# Patient Record
Sex: MALE | Race: WHITE | NOT HISPANIC OR LATINO
[De-identification: names, ages, dates, MRNs, and addresses within clinical notes are randomized per-mention and may not be internally consistent; named-entity substitution may affect disease eponyms.]

---

## 2023-07-15 ENCOUNTER — TRANSCRIPTION ENCOUNTER (OUTPATIENT)
Age: 63
End: 2023-07-15

## 2023-07-15 ENCOUNTER — INPATIENT (INPATIENT)
Facility: HOSPITAL | Age: 63
LOS: 0 days | Discharge: HOME | DRG: 206 | End: 2023-07-16
Attending: THORACIC SURGERY (CARDIOTHORACIC VASCULAR SURGERY) | Admitting: RADIOLOGY
Payer: COMMERCIAL

## 2023-07-15 VITALS — HEIGHT: 71 IN | WEIGHT: 210.76 LBS

## 2023-07-15 DIAGNOSIS — T17.808A UNSPECIFIED FOREIGN BODY IN OTHER PARTS OF RESPIRATORY TRACT CAUSING OTHER INJURY, INITIAL ENCOUNTER: ICD-10-CM

## 2023-07-15 DIAGNOSIS — T18.3 FOREIGN BODY IN SMALL INTESTINE: ICD-10-CM

## 2023-07-15 LAB
ALBUMIN SERPL ELPH-MCNC: 4.4 G/DL — SIGNIFICANT CHANGE UP (ref 3.3–5.2)
ALP SERPL-CCNC: 79 U/L — SIGNIFICANT CHANGE UP (ref 40–120)
ALT FLD-CCNC: 28 U/L — SIGNIFICANT CHANGE UP
ANION GAP SERPL CALC-SCNC: 11 MMOL/L — SIGNIFICANT CHANGE UP (ref 5–17)
APTT BLD: 30 SEC — SIGNIFICANT CHANGE UP (ref 27.5–35.5)
AST SERPL-CCNC: 17 U/L — SIGNIFICANT CHANGE UP
BASOPHILS # BLD AUTO: 0.07 K/UL — SIGNIFICANT CHANGE UP (ref 0–0.2)
BASOPHILS NFR BLD AUTO: 0.9 % — SIGNIFICANT CHANGE UP (ref 0–2)
BILIRUB SERPL-MCNC: 0.6 MG/DL — SIGNIFICANT CHANGE UP (ref 0.4–2)
BLD GP AB SCN SERPL QL: SIGNIFICANT CHANGE UP
BUN SERPL-MCNC: 16.8 MG/DL — SIGNIFICANT CHANGE UP (ref 8–20)
CALCIUM SERPL-MCNC: 8.7 MG/DL — SIGNIFICANT CHANGE UP (ref 8.4–10.5)
CHLORIDE SERPL-SCNC: 106 MMOL/L — SIGNIFICANT CHANGE UP (ref 96–108)
CO2 SERPL-SCNC: 24 MMOL/L — SIGNIFICANT CHANGE UP (ref 22–29)
CREAT SERPL-MCNC: 0.98 MG/DL — SIGNIFICANT CHANGE UP (ref 0.5–1.3)
EGFR: 87 ML/MIN/1.73M2 — SIGNIFICANT CHANGE UP
EOSINOPHIL # BLD AUTO: 0.18 K/UL — SIGNIFICANT CHANGE UP (ref 0–0.5)
EOSINOPHIL NFR BLD AUTO: 2.3 % — SIGNIFICANT CHANGE UP (ref 0–6)
GLUCOSE SERPL-MCNC: 96 MG/DL — SIGNIFICANT CHANGE UP (ref 70–99)
HCT VFR BLD CALC: 41.4 % — SIGNIFICANT CHANGE UP (ref 39–50)
HGB BLD-MCNC: 13.4 G/DL — SIGNIFICANT CHANGE UP (ref 13–17)
IMM GRANULOCYTES NFR BLD AUTO: 0.4 % — SIGNIFICANT CHANGE UP (ref 0–0.9)
INR BLD: 1.06 RATIO — SIGNIFICANT CHANGE UP (ref 0.88–1.16)
LYMPHOCYTES # BLD AUTO: 2.66 K/UL — SIGNIFICANT CHANGE UP (ref 1–3.3)
LYMPHOCYTES # BLD AUTO: 33.8 % — SIGNIFICANT CHANGE UP (ref 13–44)
MAGNESIUM SERPL-MCNC: 2.4 MG/DL — SIGNIFICANT CHANGE UP (ref 1.6–2.6)
MCHC RBC-ENTMCNC: 28.3 PG — SIGNIFICANT CHANGE UP (ref 27–34)
MCHC RBC-ENTMCNC: 32.4 GM/DL — SIGNIFICANT CHANGE UP (ref 32–36)
MCV RBC AUTO: 87.3 FL — SIGNIFICANT CHANGE UP (ref 80–100)
MONOCYTES # BLD AUTO: 0.61 K/UL — SIGNIFICANT CHANGE UP (ref 0–0.9)
MONOCYTES NFR BLD AUTO: 7.8 % — SIGNIFICANT CHANGE UP (ref 2–14)
NEUTROPHILS # BLD AUTO: 4.31 K/UL — SIGNIFICANT CHANGE UP (ref 1.8–7.4)
NEUTROPHILS NFR BLD AUTO: 54.8 % — SIGNIFICANT CHANGE UP (ref 43–77)
PLATELET # BLD AUTO: 212 K/UL — SIGNIFICANT CHANGE UP (ref 150–400)
POTASSIUM SERPL-MCNC: 3.8 MMOL/L — SIGNIFICANT CHANGE UP (ref 3.5–5.3)
POTASSIUM SERPL-SCNC: 3.8 MMOL/L — SIGNIFICANT CHANGE UP (ref 3.5–5.3)
PROT SERPL-MCNC: 6.5 G/DL — LOW (ref 6.6–8.7)
PROTHROM AB SERPL-ACNC: 12.3 SEC — SIGNIFICANT CHANGE UP (ref 10.5–13.4)
RBC # BLD: 4.74 M/UL — SIGNIFICANT CHANGE UP (ref 4.2–5.8)
RBC # FLD: 13.1 % — SIGNIFICANT CHANGE UP (ref 10.3–14.5)
SODIUM SERPL-SCNC: 141 MMOL/L — SIGNIFICANT CHANGE UP (ref 135–145)
WBC # BLD: 7.86 K/UL — SIGNIFICANT CHANGE UP (ref 3.8–10.5)
WBC # FLD AUTO: 7.86 K/UL — SIGNIFICANT CHANGE UP (ref 3.8–10.5)

## 2023-07-15 PROCEDURE — 71250 CT THORAX DX C-: CPT | Mod: 26

## 2023-07-15 PROCEDURE — 99222 1ST HOSP IP/OBS MODERATE 55: CPT

## 2023-07-15 RX ORDER — SODIUM CHLORIDE 9 MG/ML
3 INJECTION INTRAMUSCULAR; INTRAVENOUS; SUBCUTANEOUS EVERY 8 HOURS
Refills: 0 | Status: DISCONTINUED | OUTPATIENT
Start: 2023-07-15 | End: 2023-07-16

## 2023-07-15 RX ORDER — LOSARTAN POTASSIUM 100 MG/1
1 TABLET, FILM COATED ORAL
Refills: 0 | DISCHARGE

## 2023-07-15 RX ORDER — PIPERACILLIN AND TAZOBACTAM 4; .5 G/20ML; G/20ML
3.38 INJECTION, POWDER, LYOPHILIZED, FOR SOLUTION INTRAVENOUS ONCE
Refills: 0 | Status: COMPLETED | OUTPATIENT
Start: 2023-07-15 | End: 2023-07-15

## 2023-07-15 RX ORDER — ATORVASTATIN CALCIUM 80 MG/1
1 TABLET, FILM COATED ORAL
Refills: 0 | DISCHARGE

## 2023-07-15 RX ORDER — PIPERACILLIN AND TAZOBACTAM 4; .5 G/20ML; G/20ML
3.38 INJECTION, POWDER, LYOPHILIZED, FOR SOLUTION INTRAVENOUS EVERY 8 HOURS
Refills: 0 | Status: DISCONTINUED | OUTPATIENT
Start: 2023-07-15 | End: 2023-07-16

## 2023-07-15 RX ORDER — LOSARTAN POTASSIUM 100 MG/1
25 TABLET, FILM COATED ORAL DAILY
Refills: 0 | Status: DISCONTINUED | OUTPATIENT
Start: 2023-07-15 | End: 2023-07-16

## 2023-07-15 RX ORDER — ATORVASTATIN CALCIUM 80 MG/1
20 TABLET, FILM COATED ORAL AT BEDTIME
Refills: 0 | Status: DISCONTINUED | OUTPATIENT
Start: 2023-07-15 | End: 2023-07-16

## 2023-07-15 RX ADMIN — SODIUM CHLORIDE 3 MILLILITER(S): 9 INJECTION INTRAMUSCULAR; INTRAVENOUS; SUBCUTANEOUS at 05:47

## 2023-07-15 RX ADMIN — PIPERACILLIN AND TAZOBACTAM 200 GRAM(S): 4; .5 INJECTION, POWDER, LYOPHILIZED, FOR SOLUTION INTRAVENOUS at 03:03

## 2023-07-15 RX ADMIN — SODIUM CHLORIDE 3 MILLILITER(S): 9 INJECTION INTRAMUSCULAR; INTRAVENOUS; SUBCUTANEOUS at 13:52

## 2023-07-15 RX ADMIN — ATORVASTATIN CALCIUM 20 MILLIGRAM(S): 80 TABLET, FILM COATED ORAL at 21:43

## 2023-07-15 RX ADMIN — PIPERACILLIN AND TAZOBACTAM 25 GRAM(S): 4; .5 INJECTION, POWDER, LYOPHILIZED, FOR SOLUTION INTRAVENOUS at 07:32

## 2023-07-15 RX ADMIN — LOSARTAN POTASSIUM 25 MILLIGRAM(S): 100 TABLET, FILM COATED ORAL at 14:47

## 2023-07-15 RX ADMIN — PIPERACILLIN AND TAZOBACTAM 25 GRAM(S): 4; .5 INJECTION, POWDER, LYOPHILIZED, FOR SOLUTION INTRAVENOUS at 14:48

## 2023-07-15 RX ADMIN — PIPERACILLIN AND TAZOBACTAM 25 GRAM(S): 4; .5 INJECTION, POWDER, LYOPHILIZED, FOR SOLUTION INTRAVENOUS at 21:43

## 2023-07-15 RX ADMIN — SODIUM CHLORIDE 3 MILLILITER(S): 9 INJECTION INTRAMUSCULAR; INTRAVENOUS; SUBCUTANEOUS at 22:00

## 2023-07-15 NOTE — H&P ADULT - NSICDXFAMHXNEG_GEN_ALL
asthma/atrial fibrillation/cancer/congestive heart failure/COPD/coronary disease/diabetes/kidney disease

## 2023-07-15 NOTE — H&P ADULT - ASSESSMENT
63 M with h/o HTN, HLD, [place] [facility] after accidentally swallowing a metal bolt while changing a light fixture.  Denies shortness of breath but states has chest discomfort and can feel bolt inside.  Pt transferred to [facility] for bronchoscopy 7/15 with Dr. Benton for FB retrieval.

## 2023-07-15 NOTE — H&P ADULT - PROBLEM SELECTOR PLAN 1
Pt planned for bronchoscopy with foreign body retrieval later this morning with Dr. Benton.  Pt to remain NPO  Zosyn for empiric coverage  Telemetry,   Noncontrast CT chest pending    Plan discussed with Dr. Benton.

## 2023-07-15 NOTE — H&P ADULT - HISTORY OF PRESENT ILLNESS
63 M with h/o HTN, HLD, initially presented to [facility] [place] [facility] on 7/14 after accidentally swallowing a metal bolt while changing a light fixture.  Denies shortness of breath but states has chest discomfort and can feel bolt inside.  Pt transferred to [facility] for bronchoscopy 7/15 with Dr. Benton for FB retrieval.  Currently has no other physical complaints at this time.  Denies f/c, n/v, chest pain, sob, abdominal pain, d/c, urinary symptoms.  ROS negative x 10 except as indicated in HPI.

## 2023-07-15 NOTE — H&P ADULT - NSHPPHYSICALEXAM_GEN_ALL_CORE
Neuro: A+O x 3, non-focal, speech clear and intact  HEENT: PERRL, EOMI, oral mucosa pink and moist  Neck: supple, no JVD  CV: regular rate, regular rhythm, +S1S2, no murmurs or rub  Pulm/chest: lung sounds CTA and equal bilaterally, no accessory muscle use noted  Abd: soft, NT, ND, +BS  Ext: MCKEON x 4, no C/C/E  Skin: warm, well perfused, no rashes

## 2023-07-16 ENCOUNTER — TRANSCRIPTION ENCOUNTER (OUTPATIENT)
Age: 63
End: 2023-07-16

## 2023-07-16 VITALS
RESPIRATION RATE: 17 BRPM | SYSTOLIC BLOOD PRESSURE: 137 MMHG | OXYGEN SATURATION: 100 % | HEART RATE: 55 BPM | DIASTOLIC BLOOD PRESSURE: 85 MMHG

## 2023-07-16 PROCEDURE — 31635 BRONCHOSCOPY W/FB REMOVAL: CPT

## 2023-07-16 PROCEDURE — 36415 COLL VENOUS BLD VENIPUNCTURE: CPT

## 2023-07-16 PROCEDURE — 85025 COMPLETE CBC W/AUTO DIFF WBC: CPT

## 2023-07-16 PROCEDURE — 85610 PROTHROMBIN TIME: CPT

## 2023-07-16 PROCEDURE — 71045 X-RAY EXAM CHEST 1 VIEW: CPT

## 2023-07-16 PROCEDURE — 88300 SURGICAL PATH GROSS: CPT | Mod: 26

## 2023-07-16 PROCEDURE — 85730 THROMBOPLASTIN TIME PARTIAL: CPT

## 2023-07-16 PROCEDURE — 80053 COMPREHEN METABOLIC PANEL: CPT

## 2023-07-16 PROCEDURE — 86803 HEPATITIS C AB TEST: CPT

## 2023-07-16 PROCEDURE — 88300 SURGICAL PATH GROSS: CPT

## 2023-07-16 PROCEDURE — 86900 BLOOD TYPING SEROLOGIC ABO: CPT

## 2023-07-16 PROCEDURE — 71250 CT THORAX DX C-: CPT

## 2023-07-16 PROCEDURE — 86901 BLOOD TYPING SEROLOGIC RH(D): CPT

## 2023-07-16 PROCEDURE — 86850 RBC ANTIBODY SCREEN: CPT

## 2023-07-16 PROCEDURE — 71045 X-RAY EXAM CHEST 1 VIEW: CPT | Mod: 26

## 2023-07-16 PROCEDURE — 83735 ASSAY OF MAGNESIUM: CPT

## 2023-07-16 RX ORDER — SODIUM CHLORIDE 9 MG/ML
1000 INJECTION, SOLUTION INTRAVENOUS
Refills: 0 | Status: DISCONTINUED | OUTPATIENT
Start: 2023-07-16 | End: 2023-07-16

## 2023-07-16 RX ORDER — DEXAMETHASONE 0.5 MG/5ML
8 ELIXIR ORAL ONCE
Refills: 0 | Status: COMPLETED | OUTPATIENT
Start: 2023-07-16 | End: 2023-07-16

## 2023-07-16 RX ORDER — FENTANYL CITRATE 50 UG/ML
25 INJECTION INTRAVENOUS
Refills: 0 | Status: DISCONTINUED | OUTPATIENT
Start: 2023-07-16 | End: 2023-07-16

## 2023-07-16 RX ADMIN — PIPERACILLIN AND TAZOBACTAM 25 GRAM(S): 4; .5 INJECTION, POWDER, LYOPHILIZED, FOR SOLUTION INTRAVENOUS at 05:29

## 2023-07-16 RX ADMIN — LOSARTAN POTASSIUM 25 MILLIGRAM(S): 100 TABLET, FILM COATED ORAL at 05:29

## 2023-07-16 RX ADMIN — SODIUM CHLORIDE 3 MILLILITER(S): 9 INJECTION INTRAMUSCULAR; INTRAVENOUS; SUBCUTANEOUS at 06:15

## 2023-07-16 RX ADMIN — Medication 101.6 MILLIGRAM(S): at 12:18

## 2023-07-16 NOTE — PATIENT PROFILE ADULT - FALL HARM RISK - UNIVERSAL INTERVENTIONS
Bed in lowest position, wheels locked, appropriate side rails in place/Call bell, personal items and telephone in reach/Instruct patient to call for assistance before getting out of bed or chair/Non-slip footwear when patient is out of bed/[facility] to call system/Physically safe environment - no spills, clutter or unnecessary equipment/Purposeful Proactive Rounding/Room/bathroom lighting operational, light cord in reach

## 2023-07-16 NOTE — DISCHARGE NOTE PROVIDER - NSDCCPCAREPLAN_GEN_ALL_CORE_FT
PRINCIPAL DISCHARGE DIAGNOSIS  Diagnosis: Aspiration of foreign body into lung  Assessment and Plan of Treatment: Continue breathing exercises hourly   May resume all normal activities     PRINCIPAL DISCHARGE DIAGNOSIS  Diagnosis: Aspiration of foreign body into lung  Assessment and Plan of Treatment: Continue breathing exercises hourly   Do not drive or operate heavy machinery for the rest of the day  Call the thoracic surgery office to make your follow up appointment with Dr. Benton

## 2023-07-16 NOTE — PATIENT PROFILE ADULT - HOME ACCESSIBILITY CONCERNS
Cardiac Rhythm Strips 04/01/21-04/02/21 from [id number]     Tele Strip 04/01/21 at 1900 - SR        Tele Strip 04/02/21 at 0300 - SR none

## 2023-07-16 NOTE — DISCHARGE NOTE PROVIDER - NSDCFUADDAPPT_GEN_ALL_CORE_FT
Please call on Monday 7/17 to schedule your follow up with Dr. Benton     The throacic surgery office is located at [facility], first floor. Take a left at the end of the lobby until the end of that adame (past the elevator bank). Make a left and the office is on your right across from the elevators.     Follow up with your primary care provider in 2-4 weeks

## 2023-07-16 NOTE — PROGRESS NOTE ADULT - PROBLEM SELECTOR PLAN 1
Pt planned for OR bronchoscopy with foreign body retrieval later this morning with Dr. Benton.  Pt to remain NPO after midnight.  Zosyn for empiric coverage  Telemetry,   Noncontrast CT chest as above.    Plan to be discussed with Dr. Benton during AM rounds.

## 2023-07-16 NOTE — BRIEF OPERATIVE NOTE - NSICDXBRIEFPROCEDURE_GEN_ALL_CORE_FT
PROCEDURES:  Bronchoscopy with removal of foreign body of right lung 16-Jul-2023 09:04:24  Arnie Goldstein

## 2023-07-16 NOTE — DISCHARGE NOTE PROVIDER - CARE PROVIDER_API CALL
Alex Benton  Thoracic Surgery  [address] [place] [address]  Phone: [phone]  Fax: [phone]  Follow Up Time:

## 2023-07-16 NOTE — PROGRESS NOTE ADULT - ASSESSMENT
63 M with h/o HTN, HLD, initially presented to [facility] [place] on 7/14 after accidentally swallowing a metal bolt while changing a light fixture.  Denies shortness of breath but states has chest discomfort and can feel bolt inside.  Pt transferred to [facility] for bronchoscopy 7/15 with Dr. Benton for FB retrieval.

## 2023-07-16 NOTE — DISCHARGE NOTE PROVIDER - NSDCFUADDINST_GEN_ALL_CORE_FT
Please call the Cardiothoracic Surgery office at [phone] if you are experiencing any shortness of breath, chest pain, fevers or chills, drainage from the incisions, persistent nausea, vomiting or if you have any questions about your medications. If the symptoms are severe, call 911 and go to the nearest hospital. You can also call [phone] for an emergency [facility] ambulance, which will take you to the closest [facility].    If you need any assistance for making any appointments for a new consult or referral in any specialty, please call our [facility] Clinical Coordination Center at [phone].

## 2023-07-16 NOTE — DISCHARGE NOTE NURSING/CASE MANAGEMENT/SOCIAL WORK - PATIENT PORTAL LINK FT
You can access the FollowMyHealth Patient Portal offered by [facility] by registering at the following website: [email or web address]. By joining [facility]’s FollowMyHealth portal, you will also be able to view your health information using other applications (apps) compatible with our system.

## 2023-07-16 NOTE — DISCHARGE NOTE PROVIDER - HOSPITAL COURSE
63 M with h/o HTN, HLD, initially presented to [facility] on 7/14 after accidentally aspirating a metal bolt while changing a light fixture.  Denies shortness of breath but states has chest discomfort and can feel bolt inside.  Pt transferred to [facility] for bronchoscopy 7/15 with Dr. Benton for FB retrieval. Now s/p Bronchoscopy with removal of foreign body of right lung.P Patient received decadron IV X2 (intra op and post op). Will not need to continue steroid or antibiotic upon discharge. Patient remains stable from respiratory and hemodynamic standpoint. Discussed with Dr. Benton and patient cleared for discharge home.

## 2023-07-16 NOTE — DISCHARGE NOTE PROVIDER - NSDCMRMEDTOKEN_GEN_ALL_CORE_FT
atorvastatin 20 mg oral tablet: 1 orally once a day (at bedtime)  losartan 25 mg oral tablet: 1 orally once a day

## 2023-07-16 NOTE — DISCHARGE NOTE PROVIDER - NSDCCPTREATMENT_GEN_ALL_CORE_FT
PRINCIPAL PROCEDURE  Procedure: Bronchoscopy with removal of foreign body of right lung  Findings and Treatment:       SECONDARY PROCEDURE  Procedure: Bronchoscopy  Findings and Treatment:

## 2023-07-16 NOTE — PROGRESS NOTE ADULT - SUBJECTIVE AND OBJECTIVE BOX
Brief summary:  63yMale     Overnight events:      PAST MEDICAL & SURGICAL HISTORY:  HTN (hypertension)    HLD (hyperlipidemia)    No significant past surgical history        Medications:  atorvastatin 20 milliGRAM(s) Oral at bedtime  losartan 25 milliGRAM(s) Oral daily  piperacillin/tazobactam IVPB.. 3.375 Gram(s) IV Intermittent every 8 hours  sodium chloride 0.9% lock flush 3 milliLiter(s) IV Push every 8 hours      MEDICATIONS  (PRN):        Daily Review:    Height (cm): 180.3 (07-15 @ 01:38)  Weight (kg): 95.6 (07-15 @ 01:38)  BMI (kg/m2): 29.4 (07-15 @ 01:38)  BSA (m2): 2.16 (07-15 @ 01:38)               13.4   7.86  )-----------( 212      ( 15 Jul 2023 02:26 )             41.4   07-15    141  |  106  |  16.8  ----------------------------<  96  3.8   |  24.0  |  0.98    Ca    8.7      15 Jul 2023 02:26  Mg     2.4     07-15    TPro  6.5<L>  /  Alb  4.4  /  TBili  0.6  /  DBili  x   /  AST  17  /  ALT  28  /  AlkPhos  79  07-15    PT/INR - ( 15 Jul 2023 02:26 )   PT: 12.3 sec;   INR: 1.06 ratio      PTT - ( 15 Jul 2023 02:26 )  PTT:30.0 sec      T(C): 36.1 (07-16-23 @ 00:26), Max: 36.7 (07-15-23 @ 01:52)  HR: 48 (07-16-23 @ 00:26) (48 - 63)  BP: 151/8 (07-16-23 @ 00:26) (129/76 - 162/98)  RR: 18 (07-16-23 @ 00:26) (17 - 18)  SpO2: 100% (07-16-23 @ 00:26) (95% - 100%)        Physical Exam    Neuro: A+O x 3, non-focal, speech clear and intact  Pulm: coarse breath sounds bilaterally, no wheezing or rales  CV: RRR, irregularly irregular, +S1S2  Abd: soft, NT, ND, hypoactive / normoactive / hyperactive bowel sounds  Ext: +DP Pulses b/l, +PT pulses, no edema  Inc: Mediastinal sternal incision C/D/I/stable w/ dressing, right / left C/D/I with Ace wrap  Chest tubes: left  / right / mediastinal chest tubes               Brief summary:  63yMale seen and assessed at bedside.  Pt sitting comfortably in hospital chair in NAD on room air.  States no current physical complaints at this time.  Pt pending OR later today for FB retrieval with Dr. Benton.  Denies f/c, n/v, chest pain, sob, abdominal pain, d/c, urinary symptoms.  ROS negative x 10.    Overnight events:  None.  Hospital course progressing as expected.      PAST MEDICAL & SURGICAL HISTORY:  HTN (hypertension)    HLD (hyperlipidemia)    No significant past surgical history        Medications:  atorvastatin 20 milliGRAM(s) Oral at bedtime  losartan 25 milliGRAM(s) Oral daily  piperacillin/tazobactam IVPB.. 3.375 Gram(s) IV Intermittent every 8 hours  sodium chloride 0.9% lock flush 3 milliLiter(s) IV Push every 8 hours      MEDICATIONS  (PRN):        Daily Review:    Height (cm): 180.3 (07-15 @ 01:38)  Weight (kg): 95.6 (07-15 @ 01:38)  BMI (kg/m2): 29.4 (07-15 @ 01:38)  BSA (m2): 2.16 (07-15 @ 01:38)               13.4   7.86  )-----------( 212      ( 15 Jul 2023 02:26 )             41.4   07-15    141  |  106  |  16.8  ----------------------------<  96  3.8   |  24.0  |  0.98    Ca    8.7      15 Jul 2023 02:26  Mg     2.4     07-15    TPro  6.5<L>  /  Alb  4.4  /  TBili  0.6  /  DBili  x   /  AST  17  /  ALT  28  /  AlkPhos  79  07-15    PT/INR - ( 15 Jul 2023 02:26 )   PT: 12.3 sec;   INR: 1.06 ratio      PTT - ( 15 Jul 2023 02:26 )  PTT:30.0 sec      T(C): 36.1 (07-16-23 @ 00:26), Max: 36.7 (07-15-23 @ 01:52)  HR: 48 (07-16-23 @ 00:26) (48 - 63)  BP: 151/8 (07-16-23 @ 00:26) (129/76 - 162/98)  RR: 18 (07-16-23 @ 00:26) (17 - 18)  SpO2: 100% (07-16-23 @ 00:26) (95% - 100%)        Physical Exam    Neuro: A+O x 3, non-focal, speech clear and intact  Pulm: coarse breath sounds bilaterally, no wheezing or rales  CV: RRR, +S1S2  Abd: soft, NT, ND, normoactive bowel sounds  Ext: +DP Pulses b/l, +PT pulses, no edema        Noncontrast CT Chest 7/15/23    < from: CT Chest No Cont (07.15.23 @ 08:46) >    ACC: [id number] EXAM:  CT CHEST   ORDERED BY: LEX SAMANO     PROCEDURE DATE:  07/15/2023          INTERPRETATION:  Clinical information: Swallowed metal bolt.    CT scan of the chest was obtained without administration of intravenous   contrast.    No hilar and or mediastinal adenopathy is noted.    Heart is normal in size. No pericardial effusion is noted.    Radiodense foreign body representing metal bolt is noted within the right   lower lobe bronchus. Few linear opacities representing atelectasis are   noted in the right lower lobe. No pleural effusions are noted.    Below the diaphragm, visualized portions of the abdomen are unremarkable.    Degenerative changes of the spine are noted.    IMPRESSION: No radiodense foreign body representing metal bolt is noted   in the right lower lobe bronchus.    --- End of Report ---      < end of copied text >

## 2023-07-17 LAB
HCV AB S/CO SERPL IA: 0.07 S/CO — SIGNIFICANT CHANGE UP (ref 0–0.99)
HCV AB SERPL-IMP: SIGNIFICANT CHANGE UP

## 2023-07-25 PROBLEM — E78.5 HYPERLIPIDEMIA, UNSPECIFIED: Chronic | Status: ACTIVE | Noted: 2023-07-15

## 2023-07-25 PROBLEM — I10 ESSENTIAL (PRIMARY) HYPERTENSION: Chronic | Status: ACTIVE | Noted: 2023-07-15

## 2023-07-27 PROBLEM — Z00.00 ENCOUNTER FOR PREVENTIVE HEALTH EXAMINATION: Status: ACTIVE | Noted: 2023-07-27

## 2023-08-01 LAB — SURGICAL PATHOLOGY STUDY: SIGNIFICANT CHANGE UP

## 2023-08-03 ENCOUNTER — APPOINTMENT (OUTPATIENT)
Dept: THORACIC SURGERY | Facility: CLINIC | Age: 63
End: 2023-08-03
Payer: COMMERCIAL

## 2023-08-03 VITALS
BODY MASS INDEX: 29.4 KG/M2 | OXYGEN SATURATION: 97 % | SYSTOLIC BLOOD PRESSURE: 161 MMHG | WEIGHT: 210 LBS | HEART RATE: 72 BPM | HEIGHT: 71 IN | RESPIRATION RATE: 16 BRPM | DIASTOLIC BLOOD PRESSURE: 100 MMHG

## 2023-08-03 DIAGNOSIS — I10 ESSENTIAL (PRIMARY) HYPERTENSION: ICD-10-CM

## 2023-08-03 DIAGNOSIS — T17.908A UNSPECIFIED FOREIGN BODY IN RESPIRATORY TRACT, PART UNSPECIFIED CAUSING OTHER INJURY, INITIAL ENCOUNTER: ICD-10-CM

## 2023-08-03 PROCEDURE — 99212 OFFICE O/P EST SF 10 MIN: CPT

## 2023-08-07 NOTE — ASSESSMENT
[FreeTextEntry1] : Mr Hunter presents to the office to discuss his recent bronchoscopy. He had accidentally inhaled a bolt from a light fixture while looking up to replace the fixture. Bronchoscopy was performed to remove the foreign body and he recovered well. He will continue to follow up his care with his primary care physician and return to care as needed.   PLAN: - Return to care as needed

## 2023-08-07 NOTE — REVIEW OF SYSTEMS
[Feeling Poorly] : not feeling poorly [Feeling Tired] : not feeling tired [Cough] : no cough [SOB on Exertion] : no shortness of breath during exertion [Negative] : Heme/Lymph

## 2023-08-07 NOTE — DATA REVIEWED
[FreeTextEntry1] : 7/16/2023\par Cehst X ray\par  Lungs: free of consolidation, effusion or pneumothorax

## 2023-08-07 NOTE — HISTORY OF PRESENT ILLNESS
[FreeTextEntry1] : Mr. JEN CARVALHO is a 63 year male who presents today for followup. Previously, he has been followed status post bronchoscopic retrieval of foreign body.  Additional past medical history includes hypertension, hyperlipidemia   Today, the patient reports

## (undated) DEVICE — FORCEP GRASPING POLYGRAB TRIPOD DISP